# Patient Record
Sex: MALE | Race: WHITE | NOT HISPANIC OR LATINO | Employment: FULL TIME | URBAN - METROPOLITAN AREA
[De-identification: names, ages, dates, MRNs, and addresses within clinical notes are randomized per-mention and may not be internally consistent; named-entity substitution may affect disease eponyms.]

---

## 2021-01-25 ENCOUNTER — TRANSCRIBE ORDERS (OUTPATIENT)
Dept: ADMINISTRATIVE | Facility: HOSPITAL | Age: 66
End: 2021-01-25

## 2021-01-25 DIAGNOSIS — Z13.6 ENCOUNTER FOR SCREENING FOR VASCULAR DISEASE: Primary | ICD-10-CM

## 2021-02-09 ENCOUNTER — HOSPITAL ENCOUNTER (OUTPATIENT)
Dept: CARDIOLOGY | Facility: HOSPITAL | Age: 66
Discharge: HOME | End: 2021-02-09
Admitting: SURGERY

## 2021-02-09 VITALS
HEART RATE: 64 BPM | HEIGHT: 71 IN | WEIGHT: 274 LBS | DIASTOLIC BLOOD PRESSURE: 72 MMHG | BODY MASS INDEX: 38.36 KG/M2 | SYSTOLIC BLOOD PRESSURE: 134 MMHG

## 2021-02-09 DIAGNOSIS — Z13.6 ENCOUNTER FOR SCREENING FOR VASCULAR DISEASE: ICD-10-CM

## 2021-02-09 LAB
BH CV ECHO MEAS - DIST AO DIAM: 1.58 CM
BH CV VAS BP LEFT ARM: NORMAL MMHG
BH CV VAS BP RIGHT ARM: NORMAL MMHG
BH CV XLRA MEAS - MID AO DIAM: 1.89 CM
BH CV XLRA MEAS - PAD LEFT ABI DP: 1.36
BH CV XLRA MEAS - PAD LEFT ABI PT: 1.37
BH CV XLRA MEAS - PAD LEFT ARM: 134 MMHG
BH CV XLRA MEAS - PAD LEFT LEG DP: 182 MMHG
BH CV XLRA MEAS - PAD LEFT LEG PT: 184 MMHG
BH CV XLRA MEAS - PAD RIGHT ABI DP: 1.28
BH CV XLRA MEAS - PAD RIGHT ABI PT: 1.36
BH CV XLRA MEAS - PAD RIGHT ARM: 128 MMHG
BH CV XLRA MEAS - PAD RIGHT LEG DP: 172 MMHG
BH CV XLRA MEAS - PAD RIGHT LEG PT: 182 MMHG
BH CV XLRA MEAS - PROX AO DIAM: 2.2 CM
BH CV XLRA MEAS LEFT ICA/CCA RATIO: 0.83
BH CV XLRA MEAS LEFT MID CCA PSV: NORMAL CM/SEC
BH CV XLRA MEAS LEFT MID ICA PSV: NORMAL CM/SEC
BH CV XLRA MEAS LEFT PROX ECA PSV: NORMAL CM/SEC
BH CV XLRA MEAS RIGHT ICA/CCA RATIO: 1.03
BH CV XLRA MEAS RIGHT MID CCA PSV: NORMAL CM/SEC
BH CV XLRA MEAS RIGHT MID ICA PSV: NORMAL CM/SEC
BH CV XLRA MEAS RIGHT PROX ECA PSV: NORMAL CM/SEC

## 2021-02-09 PROCEDURE — 93799 UNLISTED CV SVC/PROCEDURE: CPT

## 2021-03-22 ENCOUNTER — BULK ORDERING (OUTPATIENT)
Dept: CASE MANAGEMENT | Facility: OTHER | Age: 66
End: 2021-03-22

## 2021-03-22 DIAGNOSIS — Z23 IMMUNIZATION DUE: ICD-10-CM

## 2021-05-06 ENCOUNTER — OFFICE VISIT (OUTPATIENT)
Dept: CARDIOLOGY | Facility: CLINIC | Age: 66
End: 2021-05-06

## 2021-05-06 VITALS
SYSTOLIC BLOOD PRESSURE: 130 MMHG | HEART RATE: 59 BPM | HEIGHT: 72 IN | BODY MASS INDEX: 37.38 KG/M2 | DIASTOLIC BLOOD PRESSURE: 88 MMHG | WEIGHT: 276 LBS

## 2021-05-06 DIAGNOSIS — R07.2 PRECORDIAL PAIN: ICD-10-CM

## 2021-05-06 DIAGNOSIS — R06.02 SHORTNESS OF BREATH: Primary | ICD-10-CM

## 2021-05-06 DIAGNOSIS — Z82.49 FAMILY HISTORY OF CORONARY ARTERY DISEASE: ICD-10-CM

## 2021-05-06 PROCEDURE — 93000 ELECTROCARDIOGRAM COMPLETE: CPT | Performed by: INTERNAL MEDICINE

## 2021-05-06 PROCEDURE — 99204 OFFICE O/P NEW MOD 45 MIN: CPT | Performed by: INTERNAL MEDICINE

## 2021-05-06 RX ORDER — OMEGA-3S/DHA/EPA/FISH OIL/D3 300MG-1000
600 CAPSULE ORAL
COMMUNITY

## 2021-05-06 RX ORDER — CYANOCOBALAMIN (VITAMIN B-12) 500 MCG
1 TABLET ORAL DAILY
COMMUNITY

## 2021-05-06 RX ORDER — NICOTINE 14MG/24HR
1 PATCH, TRANSDERMAL 24 HOURS TRANSDERMAL DAILY
COMMUNITY

## 2021-05-06 RX ORDER — PRAVASTATIN SODIUM 20 MG
20 TABLET ORAL DAILY
COMMUNITY
Start: 2021-01-14 | End: 2021-07-13

## 2021-05-06 RX ORDER — FLUTICASONE PROPIONATE 50 MCG
1 SPRAY, SUSPENSION (ML) NASAL DAILY
COMMUNITY

## 2021-05-06 RX ORDER — PRIMIDONE 50 MG/1
TABLET ORAL
COMMUNITY
Start: 2021-01-04

## 2021-05-06 RX ORDER — LORAZEPAM 0.5 MG/1
TABLET ORAL
COMMUNITY
Start: 2021-04-27

## 2021-05-09 NOTE — PROGRESS NOTES
Date of Office Visit: 2021  Encounter Provider: Jessee Brandt MD  Place of Service: [facility] CARDIOLOGY  Patient Name: Socrates Mathur  :1955    Chief complaint: Shortness of breath.    History of Present Illness:    I had the pleasure of seeing the patient in cardiology office on 2021.  He is a very pleasant 65 year-old male with a history of hyperlipidemia and family history of multiple cardiac issues who presents for evaluation.  I also take care of the patient's father, who has multiple cardiac issues.  These include severe coronary artery disease, systolic congestive heart failure, atrial fibrillation, and ventricular tachycardia.    The patient has never had a personal history of cardiac disease, although obviously is concerned because of his father's history.  He had COVID-19 in 2020, and states that approximately 2 weeks afterwards, he began to experience shortness of breath.  He states that he has had fairly persistent shortness of breath since that time, although it has not really progressed.  He does state that he was on steroids in 2020 because of COVID-19, and he gained approximately 15 pounds afterwards.  He has not had any chest pain.  He did have an echocardiogram at [facility] on 3/17/2021, which showed an ejection fraction of 55%, grade 1 diastolic dysfunction, and no significant valvular disease.  He also had a CT scan of his chest without contrast on 2021 which was fairly unremarkable.    Past Medical History:   Diagnosis Date   • Hyperlipidemia    • Nephrolithiasis    • CHANI (obstructive sleep apnea)    • Skin cancer        Past Surgical History:   Procedure Laterality Date   • FOOT SURGERY Left    • SHOULDER SURGERY Left    • SINUS SURGERY         Current Outpatient Medications on File Prior to Visit   Medication Sig Dispense Refill   • cholecalciferol (VITAMIN D3) 10 MCG (400 UNIT) tablet Take 600 Units  "by mouth.     • Cyanocobalamin (Vitamin B 12) 500 MCG tablet Take 1 tablet by mouth Daily.     • fluticasone (FLONASE) 50 MCG/ACT nasal spray 1 spray into the nostril(s) as directed by provider Daily.     • LORazepam (ATIVAN) 0.5 MG tablet TAKE ONE TABLET BY MOUTH TWICE A DAY     • pravastatin (PRAVACHOL) 20 MG tablet Take 20 mg by mouth Daily.     • primidone (MYSOLINE) 50 MG tablet TAKE ONE TABLET BY MOUTH ONCE NIGHTLY     • Saccharomyces boulardii (Probiotic) 250 MG capsule Take 1 capsule by mouth Daily.     • TURMERIC CURCUMIN PO Take  by mouth.     • VITAMIN E PO Take  by mouth.       No current facility-administered medications on file prior to visit.     Allergies as of 05/06/2021   • (No Known Allergies)     Social History     Socioeconomic History   • Marital status:      Spouse name: Not on file   • Number of children: Not on file   • Years of education: Not on file   • Highest education level: Not on file   Tobacco Use   • Smoking status: Never Smoker   • Smokeless tobacco: Never Used   Substance and Sexual Activity   • Alcohol use: Yes     Comment: monthly or less   • Drug use: Never     Family History   Problem Relation Age of Onset   • Coronary artery disease Father    • Heart failure Father    • Atrial fibrillation Father    • Other Paternal Grandfather         Carotid artery disease       Review of Systems   Constitutional: Positive for weight gain.   Cardiovascular: Positive for dyspnea on exertion.   Respiratory: Positive for shortness of breath and snoring.    All other systems reviewed and are negative.     Objective:     Vitals:    05/06/21 0943   BP: 130/88   Pulse: 59   Weight: 125 kg (276 lb)   Height: 182.9 cm (72\")     Body mass index is 37.43 kg/m².    Constitutional:       Appearance: Healthy appearance. Well-developed.   Eyes:      Conjunctiva/sclera: Conjunctivae normal.   HENT:      Head: Normocephalic and atraumatic.   Pulmonary:      Effort: Pulmonary effort is normal.      " Breath sounds: Normal breath sounds.   Cardiovascular:      Normal rate. Regular rhythm.      Murmurs: There is no murmur.      No gallop.   Edema:     Peripheral edema absent.   Abdominal:      Palpations: Abdomen is soft.      Tenderness: There is no abdominal tenderness.   Musculoskeletal:      Cervical back: Neck supple. Skin:     General: Skin is warm.   Neurological:      Mental Status: Alert and oriented to person, place, and time.   Psychiatric:         Behavior: Behavior normal.       Lab Review:     ECG 12 Lead    Date/Time: 5/6/2021 9:43 AM  Performed by: Jessee Brandt MD  Authorized by: Jessee Brandt MD   Comparison: not compared with previous ECG   Previous ECG: no previous ECG available  Rhythm: sinus bradycardia  Rate: bradycardic  BPM: 59    Clinical impression: normal ECG            Cardiac Procedures:  1.  Echocardiogram at [facility] on 3/17/2021: The ejection fraction was 55%.  There was grade 1 diastolic dysfunction.  There was no significant valvular disease.  2.  CT scan of the chest without contrast on 4/13/2021: No significant disease.    Assessment:       Diagnosis Plan   1. Shortness of breath  Stress Test With Myocardial Perfusion   2. Precordial pain  Stress Test With Myocardial Perfusion   3. Family history of coronary artery disease  Stress Test With Myocardial Perfusion     Plan:       Again, the patient has had shortness of breath starting 2 weeks after having COVID-19 and November 2020.  He did gain about 15 pounds in December 2020 after taking steroids for COVID-19, and he is not sure if this has contributed.  He has had an unremarkable echocardiogram and noncontrast CT scan of the chest within the last several months.  At this point, I have recommended a nuclear stress test to further evaluate for any potential ischemia.  His father has severe coronary artery disease and multiple cardiac issues, and I feel this is warranted.  I discussed this in  detail with the patient, and he was in agreement with the plan.

## 2021-05-12 ENCOUNTER — HOSPITAL ENCOUNTER (OUTPATIENT)
Dept: CARDIOLOGY | Facility: HOSPITAL | Age: 66
Discharge: HOME | End: 2021-05-12
Admitting: INTERNAL MEDICINE

## 2021-05-12 VITALS — WEIGHT: 272 LBS | BODY MASS INDEX: 36.84 KG/M2 | HEIGHT: 72 IN

## 2021-05-12 DIAGNOSIS — Z82.49 FAMILY HISTORY OF CORONARY ARTERY DISEASE: ICD-10-CM

## 2021-05-12 DIAGNOSIS — R07.2 PRECORDIAL PAIN: ICD-10-CM

## 2021-05-12 DIAGNOSIS — R06.02 SHORTNESS OF BREATH: ICD-10-CM

## 2021-05-12 LAB
BH CV NUCLEAR PRIOR STUDY: 2
BH CV REST NUCLEAR ISOTOPE DOSE: 11.6 MCI
BH CV STRESS BP STAGE 1: NORMAL
BH CV STRESS BP STAGE 2: NORMAL
BH CV STRESS BP STAGE 3: NORMAL
BH CV STRESS DURATION MIN STAGE 1: 3
BH CV STRESS DURATION MIN STAGE 2: 3
BH CV STRESS DURATION MIN STAGE 3: 2
BH CV STRESS DURATION SEC STAGE 1: 0
BH CV STRESS DURATION SEC STAGE 2: 0
BH CV STRESS DURATION SEC STAGE 3: 0
BH CV STRESS GRADE STAGE 1: 10
BH CV STRESS GRADE STAGE 2: 12
BH CV STRESS GRADE STAGE 3: 14
BH CV STRESS HR STAGE 1: 99
BH CV STRESS HR STAGE 2: 125
BH CV STRESS HR STAGE 3: 146
BH CV STRESS METS STAGE 1: 5
BH CV STRESS METS STAGE 2: 7.5
BH CV STRESS METS STAGE 3: 10
BH CV STRESS NUCLEAR ISOTOPE DOSE: 34.7 MCI
BH CV STRESS PROTOCOL 1: NORMAL
BH CV STRESS RECOVERY BP: NORMAL MMHG
BH CV STRESS RECOVERY HR: 78 BPM
BH CV STRESS SPEED STAGE 1: 1.7
BH CV STRESS SPEED STAGE 2: 2.5
BH CV STRESS SPEED STAGE 3: 3.4
BH CV STRESS STAGE 1: 1
BH CV STRESS STAGE 2: 2
BH CV STRESS STAGE 3: 3
LV EF NUC BP: 63 %
MAXIMAL PREDICTED HEART RATE: 157 BPM
PERCENT MAX PREDICTED HR: 92.99 %
STRESS BASELINE BP: NORMAL MMHG
STRESS BASELINE HR: 71 BPM
STRESS PERCENT HR: 109 %
STRESS POST ESTIMATED WORKLOAD: 9 METS
STRESS POST EXERCISE DUR MIN: 8 MIN
STRESS POST EXERCISE DUR SEC: 0 SEC
STRESS POST PEAK BP: NORMAL MMHG
STRESS POST PEAK HR: 146 BPM
STRESS TARGET HR: 133 BPM

## 2021-05-12 PROCEDURE — 93018 CV STRESS TEST I&R ONLY: CPT | Performed by: INTERNAL MEDICINE

## 2021-05-12 PROCEDURE — A9502 TC99M TETROFOSMIN: HCPCS | Performed by: INTERNAL MEDICINE

## 2021-05-12 PROCEDURE — 93016 CV STRESS TEST SUPVJ ONLY: CPT | Performed by: INTERNAL MEDICINE

## 2021-05-12 PROCEDURE — 78452 HT MUSCLE IMAGE SPECT MULT: CPT

## 2021-05-12 PROCEDURE — 78452 HT MUSCLE IMAGE SPECT MULT: CPT | Performed by: INTERNAL MEDICINE

## 2021-05-12 PROCEDURE — 93017 CV STRESS TEST TRACING ONLY: CPT

## 2021-05-12 PROCEDURE — 0 TECHNETIUM TETROFOSMIN KIT: Performed by: INTERNAL MEDICINE

## 2021-05-12 RX ADMIN — TETROFOSMIN 1 DOSE: 1.38 INJECTION, POWDER, LYOPHILIZED, FOR SOLUTION INTRAVENOUS at 08:19

## 2021-05-12 RX ADMIN — TETROFOSMIN 1 DOSE: 1.38 INJECTION, POWDER, LYOPHILIZED, FOR SOLUTION INTRAVENOUS at 07:20

## 2021-05-13 NOTE — PROGRESS NOTES
Spoke with patient and informed him of normal stress testing results.  Advised him based on his blood pressure response to the test that when he is exercising he should try to keep his target heart rate at approximately 130 bpm.  He verbalized understanding.

## 2021-10-13 RX ORDER — LORATADINE 10 MG/1
10 TABLET ORAL DAILY
COMMUNITY

## 2021-10-13 RX ORDER — CEFDINIR 300 MG/1
CAPSULE ORAL
COMMUNITY
Start: 2021-09-21

## 2021-10-13 RX ORDER — IBUPROFEN 600 MG/1
TABLET ORAL
COMMUNITY
Start: 2021-09-21

## 2021-10-13 RX ORDER — CYCLOBENZAPRINE HCL 5 MG
TABLET ORAL
COMMUNITY
Start: 2021-09-28

## 2021-10-13 RX ORDER — MECLIZINE HYDROCHLORIDE 25 MG/1
25 TABLET ORAL
COMMUNITY
Start: 2021-07-02 | End: 2022-07-02

## 2021-10-13 NOTE — PROGRESS NOTES
Subjective   History of Present Illness: Socrates Mathur is a 66 y.o. male is being seen for consultation today at the request of Edmund Donohue MD for constant back pain that radiates into the left leg.  This began on in early September when he was at a car wash.  He reports tenderness in his left knee.  He sought urgent treatment and then his primary physician ordered an MRI and referred him for review.    I had seen him in my previous practice by his report for neck and back issues about 5 years ago.  He states he has felt tingling in his left foot. He is unable to walk long distances secondary to the pain in the left side of the back and proximal anterior leg.  He denies any loss of bladder/bowel control. He has been going to physical therapy and through the medical management he has gotten significantly better but still has difficulty with activity.     While in the room and during my examination of the patient I wore a mask and eye protection.  I washed my hands before and after this patient encounter.  The patient was also wearing a mask.    Back Pain  The problem occurs constantly. The problem has been gradually worsening since onset. The pain is present in the lumbar spine. The quality of the pain is described as shooting, stabbing and aching. The pain radiates to the left knee, left thigh and left foot. The symptoms are aggravated by position. Associated symptoms include tingling. Pertinent negatives include no bladder incontinence, bowel incontinence, chest pain, numbness or weakness. Treatments tried: physical therapy.       The following portions of the patient's history were reviewed and updated as appropriate: allergies, current medications, past family history, past medical history, past social history, past surgical history and problem list.    Review of Systems   Constitutional: Positive for activity change.   Respiratory: Positive for shortness of breath. Negative for chest tightness.   "  Cardiovascular: Negative for chest pain.   Gastrointestinal: Negative for bowel incontinence.   Genitourinary: Negative for bladder incontinence.   Musculoskeletal: Positive for back pain.   Neurological: Positive for tingling. Negative for weakness and numbness.   Psychiatric/Behavioral: Positive for sleep disturbance.       Objective     Vitals:    10/22/21 1431   BP: 136/78   Pulse: 90   Temp: 97.7 °F (36.5 °C)   SpO2: 100%   Height: 182.9 cm (72\")     Body mass index is 36.89 kg/m².      Physical Exam  Neurologic Exam    Physical Exam:    CONSTITUTIONAL: This 66 year old  male appears well developed, well-nourished and in no acute distress.    HEAD & FACE: the head and face are symmetric, normocephalic and atraumatic.    EYES: Inspection of the conjunctivae and lids reveals no swelling, erythema or discharge.  Pupils are round, equal and reactive to light and there is no scleral icterus.    EARS, NOSE, MOUTH & THROAT: On inspection, the ears and nose are within normal limits.    NECK: the neck is supple and symmetric. The trachea is midline with no masses.    PULMONARY: Respiratory effort is normal with no increased work of breathing or signs of respiratory distress.    CARDIOVASCULAR: Pedal pulses are +2/4 bilaterally. Examination of the extremities shows no edema or varicosities.    LYMPHATIC: There is no palpable lymphadenopathy of the neck.    MUSCULOSKELETAL: Gait and station are within normal limits. The spine has normal alignment and range of motion.    SKIN: The skin is warm, dry and intact    NEUROLOGIC:   Cranial Nerves 2-12 intact  Normal motor strength noted. Muscle bulk and tone are normal.  Sensory exam is normal to fine touch to confrontational testing bilaterally  Reflexes on the right side demonstrates 1/4 Knee Jerk Reflex, 1/4 Ankle Jerk Reflex and no ankle clonus on the right.   Reflexes on the left side demonstrates 1/4 Knee Jerk Reflex, 1/4 Ankle Jerk Reflex and no ankle clonus on " the left.  Superficial/Primitive Reflexes: primitive reflexes were absent.  Zacarias's, Babinski, and Clonus signs all negative.  No coordination deficit observed.  Radicular testing showed a negative Adryan (RADHA) test and negative straight leg raise.  Cortical function is intact and without deficits. Speech is normal.    PSYCHIATRIC: oriented to person, place and time. Patient's mood and affect are normal.    Assessment/Plan   Independent Review of Radiographic Studies:      I personally reviewed the images from the following studies.    MRI of the lumbar spine done at [facility] on October 1, 2021 reveals left facet arthropathy at L4-5 and L5-S1 with a potential for some left neuroforaminal narrowing at L5-S1 and bilateral at L4-5.    Medical Decision Making:      He does not have a specific radiculopathy in the pattern of sensation he has in the leg is in the anterior thigh which does not match a lower lumbar radiculopathy.  He has some facet arthropathy which is clearly chronic but had some sort of an acute event which based on his description was likely inflammatory in nature.    He does not have surgical pathology on the MRI of his lumbar spine nor does he have a specific radiculopathy or myelopathy on clinical exam to justify any operative intervention.  Since he is getting better I recommend he continue the physical therapy and add interventional pain management to the course of treatment.  If he can gain continued improvement over the next week or so he should be able to resume his more normal activity.    Since there is no surgical pathology have not extended neurosurgical follow-up    No follow-ups on file.    Diagnoses and all orders for this visit:    1. Lumbar facet arthropathy (Primary)  -     Ambulatory Referral to Physical Therapy Evaluate and treat  -     Ambulatory Referral to Pain Management    2. Acute left-sided low back pain without sciatica  -     Ambulatory Referral to Physical  Therapy Evaluate and treat  -     Ambulatory Referral to Pain Management             Aldo Neely MD FACS FAANS  Neurological Surgery

## 2021-10-22 ENCOUNTER — OFFICE VISIT (OUTPATIENT)
Dept: NEUROSURGERY | Facility: CLINIC | Age: 66
End: 2021-10-22

## 2021-10-22 VITALS
BODY MASS INDEX: 36.89 KG/M2 | DIASTOLIC BLOOD PRESSURE: 78 MMHG | HEART RATE: 90 BPM | TEMPERATURE: 97.7 F | HEIGHT: 72 IN | OXYGEN SATURATION: 100 % | SYSTOLIC BLOOD PRESSURE: 136 MMHG

## 2021-10-22 DIAGNOSIS — M47.816 LUMBAR FACET ARTHROPATHY: Primary | ICD-10-CM

## 2021-10-22 DIAGNOSIS — M54.50 ACUTE LEFT-SIDED LOW BACK PAIN WITHOUT SCIATICA: ICD-10-CM

## 2021-10-22 PROCEDURE — 99204 OFFICE O/P NEW MOD 45 MIN: CPT | Performed by: NEUROLOGICAL SURGERY
